# Patient Record
Sex: FEMALE | Race: WHITE | URBAN - METROPOLITAN AREA
[De-identification: names, ages, dates, MRNs, and addresses within clinical notes are randomized per-mention and may not be internally consistent; named-entity substitution may affect disease eponyms.]

---

## 2020-02-11 ENCOUNTER — OFFICE VISIT (OUTPATIENT)
Dept: FAMILY MEDICINE CLINIC | Age: 25
End: 2020-02-11

## 2020-02-11 VITALS
WEIGHT: 189 LBS | TEMPERATURE: 98.5 F | HEIGHT: 65 IN | SYSTOLIC BLOOD PRESSURE: 113 MMHG | BODY MASS INDEX: 31.49 KG/M2 | RESPIRATION RATE: 18 BRPM | OXYGEN SATURATION: 100 % | DIASTOLIC BLOOD PRESSURE: 79 MMHG | HEART RATE: 80 BPM

## 2020-02-11 DIAGNOSIS — E55.9 VITAMIN D DEFICIENCY: ICD-10-CM

## 2020-02-11 DIAGNOSIS — L98.9 SKIN PROBLEM: ICD-10-CM

## 2020-02-11 DIAGNOSIS — F41.0 PANIC ATTACKS: ICD-10-CM

## 2020-02-11 DIAGNOSIS — M67.432 GANGLION CYST OF WRIST, LEFT: ICD-10-CM

## 2020-02-11 DIAGNOSIS — Z13.220 LIPID SCREENING: ICD-10-CM

## 2020-02-11 DIAGNOSIS — F32.A ANXIETY AND DEPRESSION: Primary | ICD-10-CM

## 2020-02-11 DIAGNOSIS — Z13.29 SCREENING FOR THYROID DISORDER: ICD-10-CM

## 2020-02-11 DIAGNOSIS — F41.9 ANXIETY AND DEPRESSION: Primary | ICD-10-CM

## 2020-02-11 RX ORDER — VENLAFAXINE HYDROCHLORIDE 37.5 MG/1
37.5 CAPSULE, EXTENDED RELEASE ORAL DAILY
Qty: 7 CAP | Refills: 0 | Status: SHIPPED | OUTPATIENT
Start: 2020-02-11 | End: 2020-05-25 | Stop reason: DRUGHIGH

## 2020-02-11 RX ORDER — VENLAFAXINE HYDROCHLORIDE 75 MG/1
75 CAPSULE, EXTENDED RELEASE ORAL DAILY
Qty: 30 CAP | Refills: 2 | Status: SHIPPED | OUTPATIENT
Start: 2020-02-11 | End: 2020-05-25 | Stop reason: DRUGHIGH

## 2020-02-11 RX ORDER — PROPRANOLOL HYDROCHLORIDE 10 MG/1
10 TABLET ORAL
Qty: 30 TAB | Refills: 0 | Status: SHIPPED | OUTPATIENT
Start: 2020-02-11 | End: 2020-11-06 | Stop reason: SDUPTHER

## 2020-02-11 NOTE — PROGRESS NOTES
Chief Complaint   Patient presents with   [address]     Patient in office today to [facility] care. Pt previous pcp was Dr. Doug Martinez in [facility]. Pt has a 3year old son at home. Pt have c/o of skin problem that was noted 2 yrs ago. Pt states skin problem is located under left breast.  Have noted increase in size in the past 6 months. Pt denies itching or drainage. Pt have not treated with otc. Have c/o of anxiety that began 2013. Pt states first started after parents passed away. Mother had problems with drug addiction and passed. Father passed away at age 52 from colorectal cancer. Pt states anxiety has changed in the past yr. Pt states she feels like she cant breathe, have treated with meditation. Pt states feeling can last up to 5-10mins, then can linger for rest of day. Pt states also notes anxiety is worse a wk before menses starts. Pt denies new stressors in work or home life. Pt mostly stays at home with her son. Has a twin brother. Pt have c/o of possible cyst in left wrist  Was noted 6 months ago, pt states pain is sporadic. Pt states pain is noted more when lifting heavy objects. Have not treated pain with otc. Denies any other concerns at this time. Chief Complaint   Patient presents with   [address]     she is a 30y.o. year old female who presents for evalution. Reviewed PmHx, RxHx, FmHx, SocHx, AllgHx and updated and dated in the chart.     Review of Systems - negative except as listed above in the HPI    Objective:     Vitals:    02/11/20 0922   BP: 113/79   Pulse: 80   Resp: 18   Temp: 98.5 °F (36.9 °C)   TempSrc: Oral   SpO2: 100%   Weight: 189 lb (85.7 kg)   Height: 5' 4.57\" (1.64 m)     Physical Examination: General appearance - alert, well appearing, and in no distress  Mental status - depressed mood, anxious, but redirectable  Eyes - pupils equal and reactive, extraocular eye movements intact  Ears - bilateral TM's and external ear canals normal  Nose - normal and patent, no erythema, discharge or polyps and normal nontender sinuses  Mouth - mucous membranes moist, pharynx normal without lesions  Neck - supple, no significant adenopathy, carotids upstroke normal bilaterally, no bruits, thyroid exam: thyroid is normal in size without nodules or tenderness  Chest - clear to auscultation, no wheezes, rales or rhonchi, symmetric air entry  Heart - normal rate, regular rhythm, normal S1, S2, no murmurs  Musculoskeletal - small cyst present on dorsum of left wrist that is consistent with ganglion cyst, slight pain with deep palpation and manipulation, wrist ROM intact  Extremities - peripheral pulses normal, no pedal edema, no clubbing or cyanosis  Skin - approx quarter sized erythematous lesion present inferior to left breat that is not raised or blanchable    Assessment/ Plan:   Diagnoses and all orders for this visit:    1. Anxiety and depression  -     METABOLIC PANEL, COMPREHENSIVE  -     CBC WITH AUTOMATED DIFF  -     venlafaxine-SR (EFFEXOR-XR) 37.5 mg capsule; Take 1 Cap by mouth daily. Starting dose days 1-7.  -     venlafaxine-SR (EFFEXOR-XR) 75 mg capsule; Take 1 Cap by mouth daily. Maintenance dose. Start effexor daily. Reviewed SEs/ADRs of medication. Enc pt to est care with Willian Doan for counseling. Enc pt to follow up if sx persist or worsen or if medication SEs intolerable to discuss alt treatment options. 2. Panic attacks  -     propranolol (INDERAL) 10 mg tablet; Take 1 Tab by mouth daily as needed (anxiety). Recommended inderal as needed for acute anxiety sx. 3. Ganglion cyst of wrist, left  Reviewed supportive measures including avoiding overuse or repetitive movements, use of wrist splint, antiinflammatories as needed. Consider US for further evaluation if sx persist or worsen. 4. Lipid screening  -     LIPID PANEL  Will notify results and deviate plan based on findings.    5. Vitamin D deficiency  -     VITAMIN D, 25 HYDROXY; Future  Will notify results and deviate plan based on findings. 6. Screening for thyroid disorder  -     TSH 3RD GENERATION  Screening, asx.   7. Skin problem  Start with applying cortisone cream to lesion daily for the next 2-3 weeks. If no improvement will refer to derm for further evaluation. Follow-up and Dispositions    · Return in about 4 weeks (around 3/10/2020) for follow up. I have discussed the diagnosis with the patient and the intended plan as seen in the above orders. The patient has received an after-visit summary and questions were answered concerning future plans. Medication Side Effects and Warnings were discussed with patient: yes  Patient Labs were reviewed and or requested: yes  Patient Past Records were reviewed and or requested  yes  Patient / Caregiver Understanding of treatment plan was verbalized during office visit CARLI Kendall    Patient Instructions          Ganglions: Care Instructions  Your Care Instructions    A ganglion is a small sac, or cyst, filled with a clear fluid that is like jelly. A ganglion may look like a bump on the hand or wrist. It also can appear on your feet, ankles, knees, or shoulders. It is not cancer. A ganglion can grow out of the protective area, or capsule, around a joint. It also can grow on a tendon sheath, which covers the ropelike tendons that connect muscle to bone. A ganglion may hurt or cause numbness if it presses on a nerve. Many ganglions do not need treatment, and they often go away on their own. But if a ganglion hurts, becomes larger, causes numbness, or limits your activity, your doctor may want to drain it with a needle and syringe or remove it with minor surgery. Follow-up care is a key part of your treatment and safety. Be sure to make and go to all appointments, and call your doctor if you are having problems.  It's also a good idea to know your test results and keep a list of the medicines you take.  How can you care for yourself at home? · Wear a wrist or finger splint as directed by your doctor. It will keep your wrist or hand from moving and help reduce the fluid in the cyst. This may be all you need for the ganglion to shrink and go away. · Do not smash a ganglion with a book or other heavy object. You may break a bone or otherwise injure your wrist by trying this folk remedy, and the ganglion may return anyway. · Do not try to drain the fluid by poking the ganglion with a pin or any other sharp object. You could cause an infection. When should you call for help? Call your doctor now or seek immediate medical care if:    · You have signs of infection, such as:  ? Increased pain, swelling, warmth, or redness. ? Red streaks leading from the cyst.  ? Pus draining from the cyst.  ? A fever.    Watch closely for changes in your health, and be sure to contact your doctor if:    · You have increasing pain.     · Your ganglion is getting larger.     · You still have pain or numbness from a ganglion. Where can you learn more? Go to http://ivon-pablo.info/. Enter M262 in the search box to learn more about \"Ganglions: Care Instructions. \"  Current as of: June 26, 2019  Content Version: 12.2  © 0447-4410 [employer]. Care instructions adapted under license by [employer] (which disclaims liability or warranty for this information). If you have questions about a medical condition or this instruction, always ask your healthcare professional. [place] [address] any warranty or liability for your use of this information. Venlafaxine (By mouth)   Venlafaxine ([id number])  Treats depression, generalized anxiety disorder, panic disorder, and social anxiety disorder. Brand Name(s): Effexor XR   There may be other brand names for this medicine. When This Medicine Should Not Be Used: This medicine is not right for everyone.  Do not use it if you had an allergic reaction to venlafaxine or desvenlafaxine succinate. How to Use This Medicine:   Long Acting Capsule, Tablet, Long Acting Tablet  · Take your medicine as directed. Your dose may need to be changed several times to find what works best for you. · It is best to take the extended-release capsule at the same time each day (either in the morning or evening). · It is best to take this medicine with food or milk. · Swallow the extended-release capsule whole. Do not crush, break, or chew it. Do not place the capsule in a liquid. · If you cannot swallow the extended-release capsule, you may open it and pour the medicine into a small amount of soft food such as pudding, yogurt, or applesauce. Stir this mixture well and swallow it without chewing. · This medicine should come with a Medication Guide. Ask your pharmacist for a copy if you do not have one. · Missed dose: Take a dose as soon as you remember. If it is almost time for your next dose, wait until then and take a regular dose. Do not take extra medicine to make up for a missed dose. · Store the medicine in a closed container at room temperature, away from heat, moisture, and direct light. Drugs and Foods to Avoid:   Ask your doctor or pharmacist before using any other medicine, including over-the-counter medicines, vitamins, and herbal products. · Do not use this medicine if you have used an MAO inhibitor within the past 14 days. Do not take an MAO inhibitor for at least 7 days after you stop this medicine. · Some medicines can affect how venlafaxine works.  Tell your doctor if you are using any of the following:   ¨ Buspirone, cimetidine, fentanyl, ketoconazole, lithium, metoprolol, mirtazapine, Abdi's wort, tramadol, or tryptophan supplements  ¨ Amphetamines  ¨ Blood thinner (including warfarin)  ¨ Diuretic (water pill)  ¨ Medicine for migraine headaches  ¨ Medicine to lose weight (including phentermine)  ¨ NSAID pain or arthritis medicine (including aspirin, celecoxib, diclofenac, ibuprofen, naproxen)  ¨ Tricyclic antidepressant  · Do not drink alcohol while you are using this medicine. · Tell your doctor if you use anything else that makes you sleepy. Some examples are allergy medicine, narcotic pain medicine, and alcohol. Warnings While Using This Medicine:   · Tell your doctor if you are pregnant or breastfeeding, or if you have kidney disease, liver disease, glaucoma, heart disease, high blood pressure, or thyroid problems. Tell your doctor if you have a history of mariaelena, seizures, heart attack, or stroke. · This medicine can increase thoughts of suicide. Tell your doctor right away if you start to feel depressed and have thoughts about hurting yourself. · This medicine may cause the following problems:   ¨ Serotonin syndrome (when used with certain medicines)  ¨ Increased cholesterol levels  ¨ Increased blood pressure  ¨ Increased risk of bleeding problems  ¨ Low sodium levels  ¨ Interstitial lung disease and eosinophilic pneumonia  · This medicine may make you dizzy or drowsy. Do not drive or do anything that could be dangerous until you know how this medicine affects you. · Do not stop using this medicine suddenly. Your doctor will need to slowly decrease your dose before you stop it completely. · Tell any doctor or dentist who treats you that you are using this medicine. This medicine may affect certain medical test results. · Your doctor will do lab tests at regular visits to check on the effects of this medicine. Keep all appointments. · Keep all medicine out of the reach of children. Never share your medicine with anyone.   Possible Side Effects While Using This Medicine:   Call your doctor right away if you notice any of these side effects:  · Allergic reaction: Itching or hives, swelling in your face or hands, swelling or tingling in your mouth or throat, chest tightness, trouble breathing  · Anxiety, restlessness, fever, sweating, muscle spasms, nausea, vomiting, diarrhea, seeing or hearing things that are not there  · Blistering, peeling, red skin rash  · Chest pain, cough, trouble breathing  · Confusion, weakness, and muscle twitching  · Eye pain, vision changes, seeing halos around lights  · Fast or pounding heartbeat  · Feeling more excited or energetic than usual  · Headache, trouble concentrating, memory problems, unsteadiness  · Seizures  · Unusual behavior, thoughts of hurting yourself or others, trouble sleeping, nervousness, unusual dreams  · Unusual bleeding or bruising  If you notice these less serious side effects, talk with your doctor:   · Dry mouth  · Mild nausea, constipation, vomiting, loss of appetite, weight loss  · Sexual problems  · Sleepiness or unusual drowsiness, dizziness  If you notice other side effects that you think are caused by this medicine, tell your doctor. Call your doctor for medical advice about side effects. You may report side effects to FDA at 5-152-FDA-0808  © 2017 [address] Information is for End User's use only and may not be sold, redistributed or otherwise used for commercial purposes. The above information is an  only. It is not intended as medical advice for individual conditions or treatments. Talk to your doctor, nurse or pharmacist before following any medical regimen to see if it is safe and effective for you.

## 2020-02-11 NOTE — PATIENT INSTRUCTIONS
Ganglions: Care Instructions  Your Care Instructions    A ganglion is a small sac, or cyst, filled with a clear fluid that is like jelly. A ganglion may look like a bump on the hand or wrist. It also can appear on your feet, ankles, knees, or shoulders. It is not cancer. A ganglion can grow out of the protective area, or capsule, around a joint. It also can grow on a tendon sheath, which covers the ropelike tendons that connect muscle to bone. A ganglion may hurt or cause numbness if it presses on a nerve. Many ganglions do not need treatment, and they often go away on their own. But if a ganglion hurts, becomes larger, causes numbness, or limits your activity, your doctor may want to drain it with a needle and syringe or remove it with minor surgery. Follow-up care is a key part of your treatment and safety. Be sure to make and go to all appointments, and call your doctor if you are having problems. It's also a good idea to know your test results and keep a list of the medicines you take. How can you care for yourself at home? · Wear a wrist or finger splint as directed by your doctor. It will keep your wrist or hand from moving and help reduce the fluid in the cyst. This may be all you need for the ganglion to shrink and go away. · Do not smash a ganglion with a book or other heavy object. You may break a bone or otherwise injure your wrist by trying this folk remedy, and the ganglion may return anyway. · Do not try to drain the fluid by poking the ganglion with a pin or any other sharp object. You could cause an infection. When should you call for help? Call your doctor now or seek immediate medical care if:    · You have signs of infection, such as:  ? Increased pain, swelling, warmth, or redness. ? Red streaks leading from the cyst.  ? Pus draining from the cyst.  ?  A fever.    Watch closely for changes in your health, and be sure to contact your doctor if:    · You have increasing pain.     · Your ganglion is getting larger.     · You still have pain or numbness from a ganglion. Where can you learn more? Go to http://ivon-pablo.info/. Enter R067 in the search box to learn more about \"Ganglions: Care Instructions. \"  Current as of: June 26, 2019  Content Version: 12.2  © 4793-6889 [employer]. Care instructions adapted under license by [employer] (which disclaims liability or warranty for this information). If you have questions about a medical condition or this instruction, always ask your healthcare professional. [address] any warranty or liability for your use of this information. Venlafaxine (By mouth)   Venlafaxine ([id number])  Treats depression, generalized anxiety disorder, panic disorder, and social anxiety disorder. Brand Name(s): Effexor XR   There may be other brand names for this medicine. When This Medicine Should Not Be Used: This medicine is not right for everyone. Do not use it if you had an allergic reaction to venlafaxine or desvenlafaxine succinate. How to Use This Medicine:   Long Acting Capsule, Tablet, Long Acting Tablet  · Take your medicine as directed. Your dose may need to be changed several times to find what works best for you. · It is best to take the extended-release capsule at the same time each day (either in the morning or evening). · It is best to take this medicine with food or milk. · Swallow the extended-release capsule whole. Do not crush, break, or chew it. Do not place the capsule in a liquid. · If you cannot swallow the extended-release capsule, you may open it and pour the medicine into a small amount of soft food such as pudding, yogurt, or applesauce. Stir this mixture well and swallow it without chewing. · This medicine should come with a Medication Guide. Ask your pharmacist for a copy if you do not have one. · Missed dose: Take a dose as soon as you remember.  If it is almost time for your next dose, wait until then and take a regular dose. Do not take extra medicine to make up for a missed dose. · Store the medicine in a closed container at room temperature, away from heat, moisture, and direct light. Drugs and Foods to Avoid:   Ask your doctor or pharmacist before using any other medicine, including over-the-counter medicines, vitamins, and herbal products. · Do not use this medicine if you have used an MAO inhibitor within the past 14 days. Do not take an MAO inhibitor for at least 7 days after you stop this medicine. · Some medicines can affect how venlafaxine works. Tell your doctor if you are using any of the following:   ¨ Buspirone, cimetidine, fentanyl, ketoconazole, lithium, metoprolol, mirtazapine, Abdi's wort, tramadol, or tryptophan supplements  ¨ Amphetamines  ¨ Blood thinner (including warfarin)  ¨ Diuretic (water pill)  ¨ Medicine for migraine headaches  ¨ Medicine to lose weight (including phentermine)  ¨ NSAID pain or arthritis medicine (including aspirin, celecoxib, diclofenac, ibuprofen, naproxen)  ¨ Tricyclic antidepressant  · Do not drink alcohol while you are using this medicine. · Tell your doctor if you use anything else that makes you sleepy. Some examples are allergy medicine, narcotic pain medicine, and alcohol. Warnings While Using This Medicine:   · Tell your doctor if you are pregnant or breastfeeding, or if you have kidney disease, liver disease, glaucoma, heart disease, high blood pressure, or thyroid problems. Tell your doctor if you have a history of mariaelena, seizures, heart attack, or stroke. · This medicine can increase thoughts of suicide. Tell your doctor right away if you start to feel depressed and have thoughts about hurting yourself.   · This medicine may cause the following problems:   ¨ Serotonin syndrome (when used with certain medicines)  ¨ Increased cholesterol levels  ¨ Increased blood pressure  ¨ Increased risk of bleeding problems  ¨ Low sodium levels  ¨ Interstitial lung disease and eosinophilic pneumonia  · This medicine may make you dizzy or drowsy. Do not drive or do anything that could be dangerous until you know how this medicine affects you. · Do not stop using this medicine suddenly. Your doctor will need to slowly decrease your dose before you stop it completely. · Tell any doctor or dentist who treats you that you are using this medicine. This medicine may affect certain medical test results. · Your doctor will do lab tests at regular visits to check on the effects of this medicine. Keep all appointments. · Keep all medicine out of the reach of children. Never share your medicine with anyone. Possible Side Effects While Using This Medicine:   Call your doctor right away if you notice any of these side effects:  · Allergic reaction: Itching or hives, swelling in your face or hands, swelling or tingling in your mouth or throat, chest tightness, trouble breathing  · Anxiety, restlessness, fever, sweating, muscle spasms, nausea, vomiting, diarrhea, seeing or hearing things that are not there  · Blistering, peeling, red skin rash  · Chest pain, cough, trouble breathing  · Confusion, weakness, and muscle twitching  · Eye pain, vision changes, seeing halos around lights  · Fast or pounding heartbeat  · Feeling more excited or energetic than usual  · Headache, trouble concentrating, memory problems, unsteadiness  · Seizures  · Unusual behavior, thoughts of hurting yourself or others, trouble sleeping, nervousness, unusual dreams  · Unusual bleeding or bruising  If you notice these less serious side effects, talk with your doctor:   · Dry mouth  · Mild nausea, constipation, vomiting, loss of appetite, weight loss  · Sexual problems  · Sleepiness or unusual drowsiness, dizziness  If you notice other side effects that you think are caused by this medicine, tell your doctor.    Call your doctor for medical advice about side effects. You may report side effects to FDA at 6-363-COT-5528  © 2017 [facility] Information is for End User's use only and may not be sold, redistributed or otherwise used for commercial purposes. The above information is an  only. It is not intended as medical advice for individual conditions or treatments. Talk to your doctor, nurse or pharmacist before following any medical regimen to see if it is safe and effective for you.

## 2020-02-11 NOTE — PROGRESS NOTES
Chief Complaint   Patient presents with   [address]     Patient in office today to [facility]. Pt previous pcp was  in [facility]. Pt have c/o of skin problem that was noted 2yrs ago. Pt states skin problem is located under left breast.  Have noted increase in size in the past 6 months. Pt denies itching or drainage. Pt have not treated with otc. Have c/o of anxiety that began 2013. Pt states first started after parents passed away. Pt states anxiety has changed in the past yr. Pt states she feels like she cant breathe,have treated with meditation. Pt states feeling can last up to 5mins-10mins,then can linger for rest of day. Pt states also notes anxiety is worse a wk before menses start. Pt denies new stressors in work or home life. Pt have c/o of possible cyst in left wrist  Was noted 6 months ago,pt states pain is sporadic. Pt states pain is noted more when lifting heavy objects. Have not treated pain with otc. 1. Have you been to the ER, urgent care clinic since your last visit? Hospitalized since your last visit? No    2. Have you seen or consulted any other health care providers outside of the [address] [place] [address] since your last visit? Include any pap smears or colon screening.  No

## 2020-02-12 LAB
25(OH)D3+25(OH)D2 SERPL-MCNC: 33.3 NG/ML (ref 30–100)
ALBUMIN SERPL-MCNC: 4.4 G/DL (ref 3.9–5)
ALBUMIN/GLOB SERPL: 1.5 {RATIO} (ref 1.2–2.2)
ALP SERPL-CCNC: 65 IU/L (ref 39–117)
ALT SERPL-CCNC: 16 IU/L (ref 0–32)
AST SERPL-CCNC: 25 IU/L (ref 0–40)
BASOPHILS # BLD AUTO: 0.1 X10E3/UL (ref 0–0.2)
BASOPHILS NFR BLD AUTO: 1 %
BILIRUB SERPL-MCNC: 0.6 MG/DL (ref 0–1.2)
BUN SERPL-MCNC: 11 MG/DL (ref 6–20)
BUN/CREAT SERPL: 13 (ref 9–23)
CALCIUM SERPL-MCNC: 9.4 MG/DL (ref 8.7–10.2)
CHLORIDE SERPL-SCNC: 100 MMOL/L (ref 96–106)
CHOLEST SERPL-MCNC: 216 MG/DL (ref 100–199)
CO2 SERPL-SCNC: 22 MMOL/L (ref 20–29)
CREAT SERPL-MCNC: 0.88 MG/DL (ref 0.57–1)
EOSINOPHIL # BLD AUTO: 0.1 X10E3/UL (ref 0–0.4)
EOSINOPHIL NFR BLD AUTO: 2 %
ERYTHROCYTE [DISTWIDTH] IN BLOOD BY AUTOMATED COUNT: 12.1 % (ref 11.7–15.4)
GLOBULIN SER CALC-MCNC: 3 G/DL (ref 1.5–4.5)
GLUCOSE SERPL-MCNC: 102 MG/DL (ref 65–99)
HCT VFR BLD AUTO: 40 % (ref 34–46.6)
HDLC SERPL-MCNC: 76 MG/DL
HGB BLD-MCNC: 13.1 G/DL (ref 11.1–15.9)
IMM GRANULOCYTES # BLD AUTO: 0 X10E3/UL (ref 0–0.1)
IMM GRANULOCYTES NFR BLD AUTO: 0 %
INTERPRETATION, 910389: NORMAL
LDLC SERPL CALC-MCNC: 126 MG/DL (ref 0–99)
LYMPHOCYTES # BLD AUTO: 1.2 X10E3/UL (ref 0.7–3.1)
LYMPHOCYTES NFR BLD AUTO: 18 %
MCH RBC QN AUTO: 29.2 PG (ref 26.6–33)
MCHC RBC AUTO-ENTMCNC: 32.8 G/DL (ref 31.5–35.7)
MCV RBC AUTO: 89 FL (ref 79–97)
MONOCYTES # BLD AUTO: 0.4 X10E3/UL (ref 0.1–0.9)
MONOCYTES NFR BLD AUTO: 6 %
NEUTROPHILS # BLD AUTO: 4.8 X10E3/UL (ref 1.4–7)
NEUTROPHILS NFR BLD AUTO: 73 %
PLATELET # BLD AUTO: 273 X10E3/UL (ref 150–450)
POTASSIUM SERPL-SCNC: 4.5 MMOL/L (ref 3.5–5.2)
PROT SERPL-MCNC: 7.4 G/DL (ref 6–8.5)
RBC # BLD AUTO: 4.49 X10E6/UL (ref 3.77–5.28)
SODIUM SERPL-SCNC: 138 MMOL/L (ref 134–144)
TRIGL SERPL-MCNC: 68 MG/DL (ref 0–149)
TSH SERPL DL<=0.005 MIU/L-ACNC: 1.02 UIU/ML (ref 0.45–4.5)
VLDLC SERPL CALC-MCNC: 14 MG/DL (ref 5–40)
WBC # BLD AUTO: 6.6 X10E3/UL (ref 3.4–10.8)

## 2020-02-12 NOTE — PROGRESS NOTES
Please notify pt the followin. Cholesterol slightly elevated. I recommend pt try to work on making some diet and lifestyle change to improve this. 2. Her fasting glucose was also a little elevated so I would like to add on a hemoglobin a1c to make sure she does not have prediabetes or early type 2 diabetes. Will notify her when I receive and review those results. 3. Normal thyroid function. 4. Normal vitamin D levels. All in all labs looks pretty good. Enc pt to work on making some diet and lifestyle changes to improve cholesterol and glucose.

## 2020-02-13 NOTE — PROGRESS NOTES
Left message on voicemail to call office back  Add on testing form completed and faxed with confirmation received.

## 2020-02-14 LAB
HBA1C MFR BLD: 4.8 % (ref 4.8–5.6)
SPECIMEN STATUS REPORT, ROLRST: NORMAL

## 2020-02-14 NOTE — PROGRESS NOTES
The following message was sent to pt via [employer] portal in reference to lab results:    Good morning Ms. Bridgette Elliott,     Your hemoglobin a1c came back normal. This is a test that measures your average blood sugar over the last 3 months and is used to screen for prediabetes or early type 2 diabetes. Please let me know if you have any questions or concerns regarding these results.    CARLI Banegas

## 2020-02-19 ENCOUNTER — OFFICE VISIT (OUTPATIENT)
Dept: FAMILY MEDICINE CLINIC | Age: 25
End: 2020-02-19

## 2020-02-19 DIAGNOSIS — F41.1 GENERALIZED ANXIETY DISORDER: Primary | ICD-10-CM

## 2020-02-19 NOTE — PROGRESS NOTES
Assessment    Name:  Estela Stearns                   :    1995   Date:    20    PRESENTING PROBLEM:     Pt was referred for help with anxiety and depression. She reports that she \"can't turn her mind off,\" thoughts keep racing through it - thoughts about what she has to do, worries about finances and other things. Said her thoughts are repetitive in nature. She also reports panic attacks, and worrying about having panic attacks. She has trouble sleeping, feels tired all the time, has trouble concentrating. She reports extreme anxiety all the time - worrying trouble relaxing, being restless, easily annoyed, and worried that something awful will happen. She feels lonely all the time. PRECIPITANTS:    Pt said her anxiety has increased since her father  in , and then again after her son was born in 2018. Suicidal Ideation:  denies and has no intention of killing herself but wishes sometimes that she would just \"disappear. \"  Homicidal Ideation:  denies  Self-Harm: denies  Substance Use:  Alcohol Pt scored high on the AUDIT, aa 14. She was drinking 4 or more times a week, having 4 or more drinks at a time every week. She stopped drinking when she started the medicine about a week ago. Pt educated on high risk drinking. Pt's mother was addicted to opioids for pt's entire life, until mother  in  from heart failure. TRAUMA HISTORY:   Traumatic Event Pt denies any hx of abuse, but describes traumatic childhood events. She talked about feeling alone because her mother would be out of it due to drugs a lot of the time. Sometimes mother would show up under the influence to pt's athletic events which was very embarrassing to pt. Mother also had seizures, and pt said it was very upsetting to see mother have a seizure and taken away in an ambulance.   Remembered one time when her mom fell to the wooden kitchen floor and was seizing and hitting her head repeatedly on the floor, and father was crying. Another time after the seizure mother would be like \" a wild animal\" and pt and siblings were afraid of her. Pt states that she was \"angry\" at her mom and family growing up. PSYCH HISTORY:  Outpatient: Pt saw a counselor 1x in college for feeling sad after her mom , but never went back. Did not realize that she was having anxiety until older, and then tried to deal with it on her own. Just started effexor 1 week ago and thinks it is helping. Family history: Pt said her oldest sister has anxiety and takes meds, which have helped sister. She thinks her mom may have been anxious and self-medicating with pain pills. She wonders about her twin brother but doesn't know. SOCIAL HISTORY:  Pt grew up locally, raised by both parents with 2 older sisters and a twin brother. Pt did well in school, played sports - softball and field hockey, had many friends. She attended [employer] and received her Associates degree in [place]. Then attended [employer] and graduated. She met her  during the summer at a friend's party, they have been  x 7 years and pt said he is very supportive.  is a manager and has a good job. 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 Currently stays home with her 1yo son and doesn't work. Her day consists of caring for son and cleaning the house, chores, etc.  Both her parents are  - her mother  from heart failure when pt was 10yo in , and father  from cancer in . Pt described her father as her \"best friend,\" and misses him terribly. Her siblings all live in the area, and she sees them occasionally. She describes and on-and-off relationship with them. When she was pregnant she felt they were very negative and she cut off contact with them. Since having son she wanted more family contact, and now sees them again.   She also has a grandmother that she is in contact with. Pt said she has numerous friends that she goes out with, but does not talk to them about her problems. Pt likes to listen to music and does daily meditation in the morning and evening. MEDICAL ISSUES:    Pt did not report any medical concerns. CURRENT MEDS:    Prior to Admission medications    Medication Sig Start Date End Date Taking? Authorizing Provider   venlafaxine-SR [facility] 37.5 mg capsule Take 1 Cap by mouth daily. Starting dose days 1-7. 2/11/20   Chip Richardson NP   venlafaxine-SR [facility] 75 mg capsule Take 1 Cap by mouth daily. Maintenance dose. 2/11/20   Chip Richardson NP   propranolol (INDERAL) 10 mg tablet Take 1 Tab by mouth daily as needed (anxiety). 2/11/20   Chip Richardson NP        MINI-MENTAL STATUS EXAM:    Orientation  person, place, time/date and situation   Behavior  cooperative   Eye Contact  Poor eye contact at times, looking at the floor or away.    Appearance:   age appropriate, casually dressed, overweight, piercings and pierced nose   Motor Behavior:   within normal limits and fiddling with her sweater with her fingers in nervous habit   Speech:   normal rate and volume   Thought Process:  goal directed and logical   Thought Content  free of delusions and free of hallucinations   Mood:   anxious and sad   Affect:   anxious and sad   Memory recent   adequate   Memory remote:   adequate   Concentration:   adequate   Insight:   fair   Motivation:  good   Judgment:   fair     STRENGTHS:  Pt is motivated for help, has supportive , stable financially                            LIMITATIONS:  ACOA issues, grief, poor coping skills                         SCREENINGS:     PHQ9:  PHQ 9 Score: 15  = severe depression                                  AUDIT score:  AUDIT Score: 14  = high    PTSD score:  PTSD Primary Care Total Score : 4      JOCE 7:  BSHSI AMB JOCE-7 SCORE: 21 = severe anxiety                      ASSESSMENT AND RECOMMENDATIONS:  Pt presents as anxious, talking fast right away. Motivated for help and very open sharing her story. Her main concern right now is her anxiety, racing thoughts, and panic attacks, said she can't stop thinking and worrying about things, and does not want to burden  with this. Pt's symptoms seems to have worsened when her father  in , as he was a big support. They worsened again when she had her son in 2018, which stirred up many family memories, good and bad. Pt repeated several times that she was \"angry\" at her mom, and felt \"lonely\" since her dad . Educated pt on ACOA effects and roles, pt immediately related to this, identified herself as the \"lost child,\" and her siblings as the other roles. Pt also understood that she was using some compulsive tendencies at home with cleaning and organizing to help control her anxiety, has some obsessive thinking as well and ruminates about things - the past, current and future worries, tasks to complete. She has found meditation a little helpful, likes the idea of \"acceptance. \"  [facility] shared The Worry Trick book with her and the ACT approach to dealing with worries, she liked this so [facility] gave a few handouts from The [address] [place] [address]. Pt will look over and will discuss at next session. Pt had talked about wanting to work out but not feeling motivated, [facility] gave a handout on mindful yoga poses, pt is excited to try. Will focus on reducing her anxiety and racing thoughts, and then introduce some ACOA info and refer pt to counselor in community. Due to complaint of racing thoughts, will assess for bipolar II with MDQ at next session. Briefly assessed for ADHD but pt reports no problems concentrating in school or organizing her life. Pt could not identify any triggers to her anxiety and panic attacks, but she will journal the event, thoughts, and feelings when it happens and we will discuss at next session and look for patterns. DIAGNOSIS:  Axis I: Generalized Anxiety Disorder  Axis II: Deferred  Axis III: No past medical history on file. Axis IV: Other psychosocial or environmental problems  Axis V:  51-60 moderate symptoms    PLAN:    Follow-up and Dispositions    · Return in about 1 week (around 2/26/2020) for Follow Up. This patient was referred to the [address] [place] [address] by NP Maki Chew for help with management of anxiety and depression. Met with pt. for initial session of 60 minutes to establish contact, to assess symptoms and mental status, identify health behavior goals, and develop plan of care based on readiness to change. [facility] will coordinate with PCP for plan of care.     GOALS:  Leticia Casas will report increase in sleep   Leticia Casas will report decrease in worrying thoughts  Leticia Casas will practice self-care activities of meditation and yoga  Leticia Casas will identify triggers for panic attacks   Leticia Casas will demonstrate increased insight into ACOA effects on her current life and relationship to anxiety      INTERVENTIONS:  Provided supportive therapy and encouragement  Introduced CBT and ACT concepts  Reviewed coping strategies  Provided psychoeducation ACOA and anxiety   Reviewed anxiety management techniques  Reviewed lifestyle modifications including sleep hygiene, management of stressors, physical activity, pleasurable activities/self-care, social support, positive life goals, medication compliance    Homework given: self-care activities, relaxation practice, reading assignment of The Worry Trick handouts      Osman Rodríguez LCSW

## 2020-03-04 ENCOUNTER — OFFICE VISIT (OUTPATIENT)
Dept: FAMILY MEDICINE CLINIC | Age: 25
End: 2020-03-04

## 2020-03-04 DIAGNOSIS — F41.1 GENERALIZED ANXIETY DISORDER: Primary | ICD-10-CM

## 2020-03-04 NOTE — PROGRESS NOTES
Behavioral Health Progress Note    Date :  03/04/20   Name: Terrance Sousa   Session Length: 50    MENTAL STATUS:  Trudy Avila presents as happy, calm, said she was excited to meet again. Pt read all the handouts, feels it really applies to her with the worry. Has been noticing her worrying thoughts more and telling herself they are irrational, and this allows her to let them go. Said she has not had any more panic attacks, and her racing thoughts have decreased. She still has a lot of thoughts but she can understand them and think about them more clearly now. RISK ASSESSMENT:   Patient denies Suicidal Ideation/Homicidal Ideation/Self-Injury Behavior. ASSESSMENT AND INTERVENTION:    Pt seemed almost euphoric in mood, she explained that she was just so happy to feel better and to feel that she is working on her self-improvement. She described it as her \"journey to self-love. \"  She has been journaling her thoughts and feelings and finds that very helpful. [facility] explored possible hypomanic symptoms with pt, using the CIDI-based bipolar screening scale recommended by [facility] bipolar toolkit. Pt endorsed periods of feeling up, happy, energized, when she cleans more and gets more stuff done around the house. But she denied any impulsive behavior, any out of character behavior like spending money or saying things she wouldn't normally say. She always has racing thoughts, and with the lexapro they have decreased. Currently she does not meet criteria for bipolar II, but [facility] will monitor. Gave her MDQ to take home and complete, for another assessment. Pt has been practicing mindfulness and breathing exercises and finds them helpful. Pt shared that she has been talking with her  a lot about how she is feeling, and she wonders if it is too much for him. [facility] encouraged her to ask  and be very open with communication about what she is trying to do with her changes.   Pt shared that she feels very affected by 's moods, knows she shouldn't be. [address] explained codependency, pt related to this so gave her handout to read over. Explained boundaries, pt identified that she tends to be enmeshed with  and wants to work on this, but worries that  may not be on same page. Gave pt MI goal setting handout, to focus on the main change she wants to make. Pt will fill out. Pt liked the worry trick handouts so gave a few more. Pt reported that she has cut way down on her drinking, and has lost 4 lbs. PLAN:    Pt will return in 2-3 weeks. She will continue journaling, will take MDQ, continue with mindfulness and relaxation.

## 2020-03-30 ENCOUNTER — TELEPHONE (OUTPATIENT)
Dept: FAMILY MEDICINE CLINIC | Age: 25
End: 2020-03-30

## 2020-03-30 NOTE — TELEPHONE ENCOUNTER
[facility] called pt to inform that telehealth visits are now an option. No answer so left message.

## 2020-05-22 ENCOUNTER — TELEPHONE (OUTPATIENT)
Dept: FAMILY MEDICINE CLINIC | Age: 25
End: 2020-05-22

## 2020-05-22 DIAGNOSIS — F32.A ANXIETY AND DEPRESSION: ICD-10-CM

## 2020-05-22 DIAGNOSIS — F41.9 ANXIETY AND DEPRESSION: ICD-10-CM

## 2020-05-22 NOTE — TELEPHONE ENCOUNTER
Patient is requesting Venlafaxine 75 MG. She is completely out of medication. I offered her a virtual but patient refused. Pharmacy: [facility] on [address].  Patient's phone: [phone]

## 2020-05-25 RX ORDER — VENLAFAXINE HYDROCHLORIDE 75 MG/1
75 CAPSULE, EXTENDED RELEASE ORAL DAILY
Qty: 30 CAP | Refills: 2 | Status: SHIPPED | OUTPATIENT
Start: 2020-05-25 | End: 2020-09-04 | Stop reason: SDUPTHER

## 2020-06-12 ENCOUNTER — NURSE TRIAGE (OUTPATIENT)
Dept: OTHER | Facility: CLINIC | Age: 25
End: 2020-06-12

## 2020-06-12 ENCOUNTER — OFFICE VISIT (OUTPATIENT)
Dept: PRIMARY CARE CLINIC | Age: 25
End: 2020-06-12

## 2020-06-12 DIAGNOSIS — M79.10 MYALGIA: Primary | ICD-10-CM

## 2020-06-14 LAB
SARS-COV-2, NAA: NOT DETECTED
SPECIMEN STATUS REPORT, ROLRST: NORMAL

## 2020-09-04 DIAGNOSIS — F41.9 ANXIETY AND DEPRESSION: ICD-10-CM

## 2020-09-04 DIAGNOSIS — F32.A ANXIETY AND DEPRESSION: ICD-10-CM

## 2020-09-04 RX ORDER — VENLAFAXINE HYDROCHLORIDE 75 MG/1
75 CAPSULE, EXTENDED RELEASE ORAL DAILY
Qty: 30 CAP | Refills: 2 | Status: SHIPPED | OUTPATIENT
Start: 2020-09-04 | End: 2020-12-21 | Stop reason: SDUPTHER

## 2020-11-06 DIAGNOSIS — F41.0 PANIC ATTACKS: ICD-10-CM

## 2020-11-06 RX ORDER — PROPRANOLOL HYDROCHLORIDE 10 MG/1
10 TABLET ORAL
Qty: 30 TAB | Refills: 0 | Status: SHIPPED | OUTPATIENT
Start: 2020-11-06 | End: 2020-12-07 | Stop reason: SDUPTHER

## 2020-12-07 DIAGNOSIS — F41.0 PANIC ATTACKS: ICD-10-CM

## 2020-12-07 RX ORDER — PROPRANOLOL HYDROCHLORIDE 10 MG/1
10 TABLET ORAL
Qty: 30 TAB | Refills: 0 | Status: SHIPPED | OUTPATIENT
Start: 2020-12-07 | End: 2021-02-11 | Stop reason: SDUPTHER

## 2020-12-21 DIAGNOSIS — F32.A ANXIETY AND DEPRESSION: ICD-10-CM

## 2020-12-21 DIAGNOSIS — F41.9 ANXIETY AND DEPRESSION: ICD-10-CM

## 2020-12-21 RX ORDER — VENLAFAXINE HYDROCHLORIDE 75 MG/1
75 CAPSULE, EXTENDED RELEASE ORAL DAILY
Qty: 30 CAP | Refills: 2 | Status: SHIPPED | OUTPATIENT
Start: 2020-12-21 | End: 2021-04-13 | Stop reason: SDUPTHER

## 2021-02-11 DIAGNOSIS — F41.0 PANIC ATTACKS: ICD-10-CM

## 2021-02-11 RX ORDER — PROPRANOLOL HYDROCHLORIDE 10 MG/1
10 TABLET ORAL
Qty: 30 TAB | Refills: 0 | Status: SHIPPED | OUTPATIENT
Start: 2021-02-11 | End: 2021-04-13 | Stop reason: SDUPTHER

## 2021-04-13 DIAGNOSIS — F41.0 PANIC ATTACKS: ICD-10-CM

## 2021-04-13 DIAGNOSIS — F32.A ANXIETY AND DEPRESSION: ICD-10-CM

## 2021-04-13 DIAGNOSIS — F41.9 ANXIETY AND DEPRESSION: ICD-10-CM

## 2021-04-13 RX ORDER — VENLAFAXINE HYDROCHLORIDE 75 MG/1
75 CAPSULE, EXTENDED RELEASE ORAL DAILY
Qty: 30 CAP | Refills: 2 | Status: SHIPPED | OUTPATIENT
Start: 2021-04-13 | End: 2021-05-21 | Stop reason: SDUPTHER

## 2021-04-14 RX ORDER — PROPRANOLOL HYDROCHLORIDE 10 MG/1
10 TABLET ORAL
Qty: 30 TAB | Refills: 0 | Status: SHIPPED | OUTPATIENT
Start: 2021-04-14 | End: 2021-05-21 | Stop reason: SDUPTHER

## 2021-05-21 DIAGNOSIS — F41.9 ANXIETY AND DEPRESSION: ICD-10-CM

## 2021-05-21 DIAGNOSIS — F32.A ANXIETY AND DEPRESSION: ICD-10-CM

## 2021-05-24 RX ORDER — VENLAFAXINE HYDROCHLORIDE 75 MG/1
75 CAPSULE, EXTENDED RELEASE ORAL DAILY
Qty: 30 CAPSULE | Refills: 2 | Status: SHIPPED | OUTPATIENT
Start: 2021-05-24 | End: 2021-09-17

## 2021-09-17 DIAGNOSIS — F41.9 ANXIETY AND DEPRESSION: ICD-10-CM

## 2021-09-17 DIAGNOSIS — F32.A ANXIETY AND DEPRESSION: ICD-10-CM

## 2021-09-17 RX ORDER — VENLAFAXINE HYDROCHLORIDE 75 MG/1
CAPSULE, EXTENDED RELEASE ORAL
Qty: 30 CAPSULE | Refills: 2 | Status: SHIPPED | OUTPATIENT
Start: 2021-09-17 | End: 2022-02-11 | Stop reason: SDUPTHER

## 2021-09-17 RX ORDER — VENLAFAXINE HYDROCHLORIDE 75 MG/1
75 CAPSULE, EXTENDED RELEASE ORAL DAILY
Qty: 30 CAPSULE | Refills: 2 | Status: SHIPPED | OUTPATIENT
Start: 2021-09-17 | End: 2022-02-11 | Stop reason: SDUPTHER

## 2022-02-11 DIAGNOSIS — F32.A ANXIETY AND DEPRESSION: ICD-10-CM

## 2022-02-11 DIAGNOSIS — F41.9 ANXIETY AND DEPRESSION: ICD-10-CM

## 2022-02-11 DIAGNOSIS — F41.0 PANIC ATTACKS: ICD-10-CM

## 2022-02-11 RX ORDER — PROPRANOLOL HYDROCHLORIDE 10 MG/1
10 TABLET ORAL
Qty: 30 TABLET | Refills: 2 | Status: SHIPPED | OUTPATIENT
Start: 2022-02-11 | End: 2022-10-27 | Stop reason: SDUPTHER

## 2022-02-11 RX ORDER — VENLAFAXINE HYDROCHLORIDE 75 MG/1
75 CAPSULE, EXTENDED RELEASE ORAL DAILY
Qty: 90 CAPSULE | Refills: 1 | Status: SHIPPED | OUTPATIENT
Start: 2022-02-11

## 2022-02-12 RX ORDER — VENLAFAXINE HYDROCHLORIDE 75 MG/1
75 CAPSULE, EXTENDED RELEASE ORAL DAILY
Qty: 30 CAPSULE | Refills: 2 | Status: SHIPPED | OUTPATIENT
Start: 2022-02-12

## 2022-10-27 ENCOUNTER — PATIENT MESSAGE (OUTPATIENT)
Dept: FAMILY MEDICINE CLINIC | Age: 27
End: 2022-10-27

## 2022-10-27 DIAGNOSIS — F41.0 PANIC ATTACKS: ICD-10-CM

## 2022-10-27 RX ORDER — PROPRANOLOL HYDROCHLORIDE 10 MG/1
10 TABLET ORAL
Qty: 15 TABLET | Refills: 0 | Status: SHIPPED | OUTPATIENT
Start: 2022-10-27

## 2023-02-13 DIAGNOSIS — F41.9 ANXIETY AND DEPRESSION: ICD-10-CM

## 2023-02-13 DIAGNOSIS — F32.A ANXIETY AND DEPRESSION: ICD-10-CM

## 2023-02-13 RX ORDER — VENLAFAXINE HYDROCHLORIDE 75 MG/1
75 CAPSULE, EXTENDED RELEASE ORAL DAILY
Qty: 30 CAPSULE | Refills: 2 | Status: SHIPPED | OUTPATIENT
Start: 2023-02-13

## 2023-08-29 RX ORDER — VENLAFAXINE HYDROCHLORIDE 75 MG/1
CAPSULE, EXTENDED RELEASE ORAL
Qty: 30 CAPSULE | Refills: 1 | Status: SHIPPED | OUTPATIENT
Start: 2023-08-29

## 2024-01-02 RX ORDER — VENLAFAXINE HYDROCHLORIDE 75 MG/1
CAPSULE, EXTENDED RELEASE ORAL
Qty: 30 CAPSULE | Refills: 1 | OUTPATIENT
Start: 2024-01-02

## 2024-01-03 RX ORDER — VENLAFAXINE HYDROCHLORIDE 75 MG/1
CAPSULE, EXTENDED RELEASE ORAL
Qty: 30 CAPSULE | Refills: 1 | OUTPATIENT
Start: 2024-01-03

## 2024-01-03 RX ORDER — VENLAFAXINE HYDROCHLORIDE 75 MG/1
75 CAPSULE, EXTENDED RELEASE ORAL DAILY
Qty: 30 CAPSULE | Refills: 1 | Status: SHIPPED | OUTPATIENT
Start: 2024-01-03

## 2024-01-03 RX ORDER — VENLAFAXINE HYDROCHLORIDE 75 MG/1
75 CAPSULE, EXTENDED RELEASE ORAL DAILY
Qty: 30 CAPSULE | Refills: 1 | OUTPATIENT
Start: 2024-01-03

## 2024-01-06 RX ORDER — VENLAFAXINE HYDROCHLORIDE 75 MG/1
75 CAPSULE, EXTENDED RELEASE ORAL DAILY
Qty: 30 CAPSULE | Refills: 1 | OUTPATIENT
Start: 2024-01-06

## 2024-02-05 ENCOUNTER — OFFICE VISIT (OUTPATIENT)
Age: 29
End: 2024-02-05
Payer: COMMERCIAL

## 2024-02-05 VITALS
DIASTOLIC BLOOD PRESSURE: 83 MMHG | OXYGEN SATURATION: 97 % | TEMPERATURE: 98.5 F | BODY MASS INDEX: 37.82 KG/M2 | RESPIRATION RATE: 18 BRPM | WEIGHT: 227 LBS | HEIGHT: 65 IN | HEART RATE: 88 BPM | SYSTOLIC BLOOD PRESSURE: 122 MMHG

## 2024-02-05 DIAGNOSIS — F41.9 ANXIETY: Primary | ICD-10-CM

## 2024-02-05 PROCEDURE — 99203 OFFICE O/P NEW LOW 30 MIN: CPT | Performed by: NURSE PRACTITIONER

## 2024-02-05 RX ORDER — VENLAFAXINE HYDROCHLORIDE 37.5 MG/1
37.5 CAPSULE, EXTENDED RELEASE ORAL DAILY
Qty: 30 CAPSULE | Refills: 0 | Status: SHIPPED | OUTPATIENT
Start: 2024-02-05

## 2024-02-05 SDOH — ECONOMIC STABILITY: TRANSPORTATION INSECURITY
IN THE PAST 12 MONTHS, HAS LACK OF TRANSPORTATION KEPT YOU FROM MEETINGS, WORK, OR FROM GETTING THINGS NEEDED FOR DAILY LIVING?: NO

## 2024-02-05 SDOH — ECONOMIC STABILITY: INCOME INSECURITY: HOW HARD IS IT FOR YOU TO PAY FOR THE VERY BASICS LIKE FOOD, HOUSING, MEDICAL CARE, AND HEATING?: NOT VERY HARD

## 2024-02-05 SDOH — ECONOMIC STABILITY: FOOD INSECURITY: WITHIN THE PAST 12 MONTHS, YOU WORRIED THAT YOUR FOOD WOULD RUN OUT BEFORE YOU GOT MONEY TO BUY MORE.: NEVER TRUE

## 2024-02-05 SDOH — ECONOMIC STABILITY: FOOD INSECURITY: WITHIN THE PAST 12 MONTHS, THE FOOD YOU BOUGHT JUST DIDN'T LAST AND YOU DIDN'T HAVE MONEY TO GET MORE.: NEVER TRUE

## 2024-02-05 SDOH — ECONOMIC STABILITY: HOUSING INSECURITY
IN THE LAST 12 MONTHS, WAS THERE A TIME WHEN YOU DID NOT HAVE A STEADY PLACE TO SLEEP OR SLEPT IN A SHELTER (INCLUDING NOW)?: NO

## 2024-02-05 ASSESSMENT — PATIENT HEALTH QUESTIONNAIRE - PHQ9
SUM OF ALL RESPONSES TO PHQ9 QUESTIONS 1 & 2: 0
SUM OF ALL RESPONSES TO PHQ QUESTIONS 1-9: 0
SUM OF ALL RESPONSES TO PHQ QUESTIONS 1-9: 0
1. LITTLE INTEREST OR PLEASURE IN DOING THINGS: 0
SUM OF ALL RESPONSES TO PHQ QUESTIONS 1-9: 0
SUM OF ALL RESPONSES TO PHQ QUESTIONS 1-9: 0
2. FEELING DOWN, DEPRESSED OR HOPELESS: 0

## 2024-02-05 NOTE — PROGRESS NOTES
Chief Complaint   Patient presents with    Follow-up     Patient presents in office today for f/u.  She would like to discuss weaning off of Effexor as she wants to try to have a baby.  No other concerns.      1. \"Have you been to the ER, urgent care clinic since your last visit?  Hospitalized since your last visit?\" No    2. \"Have you seen or consulted any other health care providers outside of the [facility] since your last visit?\" No     3. For patients aged 45-75: Has the patient had a colonoscopy / FIT/ Cologuard? NA - based on age      If the patient is female:    4. For patients aged 40-74: Has the patient had a mammogram within the past 2 years? NA - based on age or sex      5. For patients aged 21-65: Has the patient had a pap smear? No

## 2024-02-08 NOTE — PROGRESS NOTES
Subjective:      Patient ID: Sophia Carmona is a 28 y.o. female.    HPI  Patient presents in office today for f/u - considered new patient since last visit 4 years ago  She would like to discuss weaning off of Effexor as she wants to try to have a baby.  Mood seems to be stable for now on 75mg effexor    Review of Systems  A comprehensive review of system was obtained and negative except findings in the HPI    /83 (Site: Left Upper Arm, Position: Sitting)   Pulse 88   Temp 98.5 °F (36.9 °C) (Oral)   Resp 18   Ht 1.638 m (5' 4.5\")   Wt 103 kg (227 lb)   LMP 01/13/2024   SpO2 97%   BMI 38.36 kg/m²   Objective:   Physical Exam  Vitals and nursing note reviewed.   Constitutional:       General: She is not in acute distress.     Appearance: Normal appearance.   Cardiovascular:      Rate and Rhythm: Normal rate and regular rhythm.   Pulmonary:      Effort: Pulmonary effort is normal. No respiratory distress.      Breath sounds: Normal breath sounds. No wheezing or rhonchi.   Musculoskeletal:         General: No swelling.   Neurological:      General: No focal deficit present.      Mental Status: She is alert and oriented to person, place, and time.   Psychiatric:         Mood and Affect: Mood normal.         Assessment / Plan:   Sophia was seen today for follow-up.    Diagnoses and all orders for this visit:    Anxiety    Other orders  -     venlafaxine (EFFEXOR XR) 37.5 MG extended release capsule; Take 1 capsule by mouth daily      Written rx for 37.5mg dose to take every day for 10 days  Then take every other day for a week then done  Reviewed potential side effects of weaning off too fast    I have discussed the diagnosis with the patient and the intended plan as seen in the above orders. The patient has received an after-visit summary and questions were answered concerning future plans. Patient conveyed understanding of the plan at the time of the visit.    Zehra Motta, MSN, ANP  2/8/2024

## 2024-03-05 RX ORDER — VENLAFAXINE HYDROCHLORIDE 37.5 MG/1
CAPSULE, EXTENDED RELEASE ORAL DAILY
Qty: 30 CAPSULE | Refills: 0 | Status: SHIPPED | OUTPATIENT
Start: 2024-03-05

## 2024-03-05 RX ORDER — VENLAFAXINE HYDROCHLORIDE 75 MG/1
CAPSULE, EXTENDED RELEASE ORAL DAILY
Qty: 30 CAPSULE | Refills: 1 | Status: SHIPPED | OUTPATIENT
Start: 2024-03-05